# Patient Record
Sex: MALE
[De-identification: names, ages, dates, MRNs, and addresses within clinical notes are randomized per-mention and may not be internally consistent; named-entity substitution may affect disease eponyms.]

---

## 2023-07-20 ENCOUNTER — DASHBOARD ENCOUNTERS (OUTPATIENT)
Age: 58
End: 2023-07-20

## 2023-08-09 ENCOUNTER — OFFICE VISIT (OUTPATIENT)
Dept: URBAN - METROPOLITAN AREA CLINIC 74 | Facility: CLINIC | Age: 58
End: 2023-08-09

## 2023-08-09 NOTE — HPI-TODAY'S VISIT:
The patient is 58-year-old male with past medical history as noted below is presenting to our clinic today to schedule Colonoscopy.

## 2024-12-16 ENCOUNTER — LAB OUTSIDE AN ENCOUNTER (OUTPATIENT)
Dept: URBAN - METROPOLITAN AREA CLINIC 74 | Facility: CLINIC | Age: 59
End: 2024-12-16

## 2024-12-16 ENCOUNTER — OFFICE VISIT (OUTPATIENT)
Dept: URBAN - METROPOLITAN AREA CLINIC 74 | Facility: CLINIC | Age: 59
End: 2024-12-16
Payer: COMMERCIAL

## 2024-12-16 VITALS
HEIGHT: 66 IN | DIASTOLIC BLOOD PRESSURE: 74 MMHG | TEMPERATURE: 98.4 F | SYSTOLIC BLOOD PRESSURE: 136 MMHG | OXYGEN SATURATION: 94 % | BODY MASS INDEX: 37.32 KG/M2 | WEIGHT: 232.2 LBS | HEART RATE: 87 BPM

## 2024-12-16 DIAGNOSIS — K21.9 GERD WITHOUT ESOPHAGITIS: ICD-10-CM

## 2024-12-16 DIAGNOSIS — K52.9 CHRONIC DIARRHEA: ICD-10-CM

## 2024-12-16 DIAGNOSIS — R19.5 LOOSE STOOLS: ICD-10-CM

## 2024-12-16 PROBLEM — 266435005: Status: ACTIVE | Noted: 2024-12-16

## 2024-12-16 PROCEDURE — 99204 OFFICE O/P NEW MOD 45 MIN: CPT | Performed by: INTERNAL MEDICINE

## 2024-12-16 RX ORDER — AMLODIPINE BESYLATE 5 MG/1
TAKE ONE TABLET BY MOUTH ONE TIME DAILY TABLET ORAL
Qty: 90 UNSPECIFIED | Refills: 0 | Status: ACTIVE | COMMUNITY

## 2024-12-16 RX ORDER — OLMESARTAN MEDOXOMIL 20 MG/1
TAKE ONE TABLET BY MOUTH ONE TIME DAILY TABLET ORAL
Qty: 90 UNSPECIFIED | Refills: 0 | Status: ACTIVE | COMMUNITY

## 2024-12-16 RX ORDER — VENLAFAXINE HYDROCHLORIDE 75 MG/1
TAKE ONE CAPSULE BY MOUTH ONE TIME DAILY CAPSULE, EXTENDED RELEASE ORAL
Qty: 90 UNSPECIFIED | Refills: 1 | Status: ACTIVE | COMMUNITY

## 2024-12-16 RX ORDER — COLESTIPOL HYDROCHLORIDE 1 G/1
1 TABLET TABLET, FILM COATED ORAL TWICE A DAY
Qty: 60 | Refills: 3 | OUTPATIENT
Start: 2024-12-16

## 2024-12-16 RX ORDER — ATORVASTATIN CALCIUM, FILM COATED 20 MG/1
TAKE ONE TABLET BY MOUTH ONE TIME DAILY TABLET ORAL
Qty: 90 UNSPECIFIED | Refills: 0 | Status: ACTIVE | COMMUNITY

## 2024-12-16 NOTE — HPI-TODAY'S VISIT:
The patient, a 59-year-old male, presented with a complaint of loose stools. The patient's wife reported that the patient has to use the restroom immediately after eating and has had several accidents. The patient has been experiencing this issue for a while-almost 1 year. The patient has tried Imodium, but it has not been effective. The patient consumes a couple of cans of beer daily. The patient does not experience any cramping or pain with the loose stools. The patient's bowel movements are frequent, possibly more than 3-4 times a day. The patient has not had any abdominal surgeries and his colonoscopy from 2020 was normal. The patient occasionally experiences heartburn. The patient does not smoke and does not take any medicines like ibuprofen, Motrin, Advil, or Aleve. The patient's blood work has been normal recently. The patient has been on Olmesartan for a long time for blood pressure control. h/o posible TIA more than 1 year ago. no recent neurological issues

## 2024-12-18 ENCOUNTER — CLAIMS CREATED FROM THE CLAIM WINDOW (OUTPATIENT)
Dept: URBAN - METROPOLITAN AREA CLINIC 4 | Facility: CLINIC | Age: 59
End: 2024-12-18
Payer: COMMERCIAL

## 2024-12-18 ENCOUNTER — CLAIMS CREATED FROM THE CLAIM WINDOW (OUTPATIENT)
Dept: URBAN - METROPOLITAN AREA SURGERY CENTER 30 | Facility: SURGERY CENTER | Age: 59
End: 2024-12-18
Payer: COMMERCIAL

## 2024-12-18 ENCOUNTER — OFFICE VISIT (OUTPATIENT)
Dept: URBAN - METROPOLITAN AREA SURGERY CENTER 30 | Facility: SURGERY CENTER | Age: 59
End: 2024-12-18

## 2024-12-18 DIAGNOSIS — K29.70 REACTIVE GASTROPATHY: ICD-10-CM

## 2024-12-18 DIAGNOSIS — K21.9 GASTRO - ESOPHAGEAL REFLUX DISEASE: ICD-10-CM

## 2024-12-18 DIAGNOSIS — K21.9 GERD WITHOUT ESOPHAGITIS: ICD-10-CM

## 2024-12-18 DIAGNOSIS — K29.70 GASTRITIS, UNSPECIFIED, WITHOUT BLEEDING: ICD-10-CM

## 2024-12-18 DIAGNOSIS — K21.9 GASTRO-ESOPHAGEAL REFLUX DISEASE WITHOUT ESOPHAGITIS: ICD-10-CM

## 2024-12-18 PROCEDURE — 88305 TISSUE EXAM BY PATHOLOGIST: CPT | Performed by: PATHOLOGY

## 2024-12-18 PROCEDURE — 00731 ANES UPR GI NDSC PX NOS: CPT | Performed by: NURSE ANESTHETIST, CERTIFIED REGISTERED

## 2024-12-18 PROCEDURE — 88312 SPECIAL STAINS GROUP 1: CPT | Performed by: PATHOLOGY

## 2024-12-18 RX ORDER — OMEPRAZOLE 40 MG/1
1 CAPSULE 30 MINUTES BEFORE MORNING MEAL CAPSULE, DELAYED RELEASE ORAL ONCE A DAY
Qty: 90 | Refills: 1 | OUTPATIENT
Start: 2024-12-18

## 2024-12-18 RX ORDER — AMLODIPINE BESYLATE 5 MG/1
TAKE ONE TABLET BY MOUTH ONE TIME DAILY TABLET ORAL
Qty: 90 UNSPECIFIED | Refills: 0 | Status: ACTIVE | COMMUNITY

## 2024-12-18 RX ORDER — ATORVASTATIN CALCIUM, FILM COATED 20 MG/1
TAKE ONE TABLET BY MOUTH ONE TIME DAILY TABLET ORAL
Qty: 90 UNSPECIFIED | Refills: 0 | Status: ACTIVE | COMMUNITY

## 2024-12-18 RX ORDER — OLMESARTAN MEDOXOMIL 20 MG/1
TAKE ONE TABLET BY MOUTH ONE TIME DAILY TABLET ORAL
Qty: 90 UNSPECIFIED | Refills: 0 | Status: ACTIVE | COMMUNITY

## 2024-12-18 RX ORDER — COLESTIPOL HYDROCHLORIDE 1 G/1
1 TABLET TABLET, FILM COATED ORAL TWICE A DAY
Qty: 60 | Refills: 3 | Status: ACTIVE | COMMUNITY
Start: 2024-12-16

## 2024-12-18 RX ORDER — VENLAFAXINE HYDROCHLORIDE 75 MG/1
TAKE ONE CAPSULE BY MOUTH ONE TIME DAILY CAPSULE, EXTENDED RELEASE ORAL
Qty: 90 UNSPECIFIED | Refills: 1 | Status: ACTIVE | COMMUNITY

## 2025-01-14 ENCOUNTER — OFFICE VISIT (OUTPATIENT)
Dept: URBAN - METROPOLITAN AREA CLINIC 74 | Facility: CLINIC | Age: 60
End: 2025-01-14
Payer: COMMERCIAL

## 2025-01-14 VITALS
DIASTOLIC BLOOD PRESSURE: 68 MMHG | HEART RATE: 90 BPM | WEIGHT: 237.8 LBS | OXYGEN SATURATION: 94 % | BODY MASS INDEX: 38.22 KG/M2 | TEMPERATURE: 98.5 F | SYSTOLIC BLOOD PRESSURE: 144 MMHG | HEIGHT: 66 IN

## 2025-01-14 DIAGNOSIS — F10.90 ALCOHOL USE: ICD-10-CM

## 2025-01-14 DIAGNOSIS — K29.90 GASTRITIS AND DUODENITIS: ICD-10-CM

## 2025-01-14 DIAGNOSIS — K21.00 GASTROESOPHAGEAL REFLUX DISEASE WITH ESOPHAGITIS WITHOUT HEMORRHAGE: ICD-10-CM

## 2025-01-14 DIAGNOSIS — K52.9 CHRONIC DIARRHEA: ICD-10-CM

## 2025-01-14 PROBLEM — 196731005: Status: ACTIVE | Noted: 2025-01-14

## 2025-01-14 PROBLEM — 266433003: Status: ACTIVE | Noted: 2025-01-14

## 2025-01-14 PROCEDURE — 99213 OFFICE O/P EST LOW 20 MIN: CPT

## 2025-01-14 RX ORDER — AMLODIPINE BESYLATE 5 MG/1
TAKE ONE TABLET BY MOUTH ONE TIME DAILY TABLET ORAL
Qty: 90 UNSPECIFIED | Refills: 0 | Status: ACTIVE | COMMUNITY

## 2025-01-14 RX ORDER — VENLAFAXINE HYDROCHLORIDE 75 MG/1
TAKE ONE CAPSULE BY MOUTH ONE TIME DAILY CAPSULE, EXTENDED RELEASE ORAL
Qty: 90 UNSPECIFIED | Refills: 1 | Status: ACTIVE | COMMUNITY

## 2025-01-14 RX ORDER — OLMESARTAN MEDOXOMIL 20 MG/1
TAKE ONE TABLET BY MOUTH ONE TIME DAILY TABLET ORAL
Qty: 90 UNSPECIFIED | Refills: 0 | Status: ACTIVE | COMMUNITY

## 2025-01-14 RX ORDER — SUCRALFATE 1 G/1
1 TABLET ON AN EMPTY STOMACH TABLET ORAL TWICE A DAY
Qty: 180 TABLET | Refills: 1 | OUTPATIENT
Start: 2025-01-14 | End: 2025-07-13

## 2025-01-14 RX ORDER — COLESTIPOL HYDROCHLORIDE 1 G/1
1 TABLET TABLET, FILM COATED ORAL TWICE A DAY
Qty: 60 | Refills: 3 | Status: ACTIVE | COMMUNITY
Start: 2024-12-16

## 2025-01-14 RX ORDER — ATORVASTATIN CALCIUM, FILM COATED 20 MG/1
TAKE ONE TABLET BY MOUTH ONE TIME DAILY TABLET ORAL
Qty: 90 UNSPECIFIED | Refills: 0 | Status: ACTIVE | COMMUNITY

## 2025-01-14 RX ORDER — OMEPRAZOLE 40 MG/1
1 CAPSULE 30 MINUTES BEFORE MORNING MEAL CAPSULE, DELAYED RELEASE ORAL ONCE A DAY
Qty: 90 | Refills: 1 | Status: ACTIVE | COMMUNITY
Start: 2024-12-18

## 2025-01-14 NOTE — HPI-TODAY'S VISIT:
The patient, a 59-year-old male, presented with a complaint of loose stools. The patient's wife reported that the patient has to use the restroom immediately after eating and has had several accidents. The patient has been experiencing this issue for a while-almost 1 year. The patient has tried Imodium, but it has not been effective. The patient consumes a couple of cans of beer daily.  They report improvement of loose stools with alcohol cessation. He was previously on medication to decrease alcohol cravings. The patient does not experience any cramping or pain with the loose stools. The patient's bowel movements are frequent, possibly more than 3-4 times a day. He has noted improvement with colestipol but not completely resolved.   The patient has not had any abdominal surgeries and his colonoscopy from 2020 was normal. The patient occasionally experiences heartburn. The patient does not smoke and does not take any medicines like ibuprofen, Motrin, Advil, or Aleve. The patient's blood work has been normal recently. The patient has been on Olmesartan for a long time for blood pressure control. h/o posible TIA more than 1 year ago. no recent neurological issues  EGD 12/18/2024, Dr. Johnson: 2cm hiatal hernia. 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 Chronic gastritis, bx with mild chemical gastropathy. Chronic duodenitis with erosions, erythema, and friability.

## 2025-01-15 LAB
A/G RATIO: 1.6
ABSOLUTE BASOPHILS: 20
ABSOLUTE EOSINOPHILS: 70
ABSOLUTE LYMPHOCYTES: 1605
ABSOLUTE MONOCYTES: 505
ABSOLUTE NEUTROPHILS: 2800
ALBUMIN: 4.7
ALKALINE PHOSPHATASE: 90
ALT (SGPT): 19
AST (SGOT): 24
BASOPHILS: 0.4
BILIRUBIN, TOTAL: 0.6
BUN/CREATININE RATIO: (no result)
BUN: 10
CALCIUM: 9.2
CARBON DIOXIDE, TOTAL: 31
CHLORIDE: 99
CREATININE: 0.86
EGFR: 99
EOSINOPHILS: 1.4
GLOBULIN, TOTAL: 2.9
GLUCOSE: 82
HEMATOCRIT: 47.6
HEMOGLOBIN: 15.9
LIPASE: 22
LYMPHOCYTES: 32.1
MCH: 29.6
MCHC: 33.4
MCV: 88.5
MONOCYTES: 10.1
MPV: 10.1
NEUTROPHILS: 56
PLATELET COUNT: 286
POTASSIUM: 3.6
PROTEIN, TOTAL: 7.6
RDW: 13.3
RED BLOOD CELL COUNT: 5.38
SODIUM: 141
WHITE BLOOD CELL COUNT: 5

## 2025-01-22 ENCOUNTER — LAB OUTSIDE AN ENCOUNTER (OUTPATIENT)
Dept: URBAN - METROPOLITAN AREA CLINIC 40 | Facility: CLINIC | Age: 60
End: 2025-01-22

## 2025-01-23 LAB
ADENOVIRUS F 40/41: NOT DETECTED
CAMPYLOBACTER: NOT DETECTED
CLOSTRIDIUM DIFFICILE: NOT DETECTED
ENTAMOEBA HISTOLYTICA: NOT DETECTED
ENTEROAGGREGATIVE E.COLI: NOT DETECTED
ENTEROTOXIGENIC E.COLI: NOT DETECTED
ESCHERICHIA COLI O157: NOT DETECTED
GIARDIA LAMBLIA: NOT DETECTED
NOROVIRUS GI/GII: NOT DETECTED
PANCREATICELASTASE ELISA, STOOL: (no result)
ROTAVIRUS A: NOT DETECTED
SALMONELLA SPP.: NOT DETECTED
SHIGA-LIKE TOXIN PRODUCING E.COLI: NOT DETECTED
SHIGELLA SPP. / ENTEROINVASIVE E.COLI: NOT DETECTED
VIBRIO PARAHAEMOLYTICUS: NOT DETECTED
VIBRIO SPP.: NOT DETECTED
YERSINIA ENTEROCOLITICA: NOT DETECTED

## 2025-02-11 ENCOUNTER — OFFICE VISIT (OUTPATIENT)
Dept: URBAN - METROPOLITAN AREA CLINIC 74 | Facility: CLINIC | Age: 60
End: 2025-02-11

## 2025-05-22 PROCEDURE — PSEU8465 CALPROTECTIN, FECAL: Performed by: CLINICAL MEDICAL LABORATORY

## 2025-05-22 PROCEDURE — 83993 ASSAY FOR CALPROTECTIN FECAL: CPT | Performed by: CLINICAL MEDICAL LABORATORY

## 2025-05-23 ENCOUNTER — LAB REQUISITION (OUTPATIENT)
Dept: LAB | Age: 60
End: 2025-05-23

## 2025-05-23 DIAGNOSIS — K52.9 NONINFECTIVE GASTROENTERITIS AND COLITIS, UNSPECIFIED: ICD-10-CM

## 2025-05-28 LAB — CALPROTECTIN STL-MCNT: 151 UG/G
